# Patient Record
(demographics unavailable — no encounter records)

---

## 2024-10-25 NOTE — HISTORY OF PRESENT ILLNESS
[Back Pain] : back pain [___ mths] : [unfilled] month(s) ago [Constant] : constant [7] : a current pain level of 7/10 [6] : an average pain level of 6/10 [1] : a minimum pain level of 1/10 [8] : a maximum pain level of 8/10 [Shooting] : shooting [Laying] : laying [Medications] : medications [FreeTextEntry1] : Interval Note:  sp RIGHT  L4-sacral ala medial branch (2 joints, 3 nerves on each side) radiofrequency ablation 8/11/24 with improvement in axial pain but does have occasional exacerbations in pain. improved with tylenol.   Denies any additional weakness, numbness, bowel/bladder dysfunction.    HPI     Mr. YOSEF LUIS is a 57 year M with pmhx of HTN, HLD, pre-DM. C/o worsening right lower back pain. Pain to left lower back at times. Notes pain began 1 1/2 years ago and over the past few months has become more constant. Pain worst in the morning. Prn NSAIDs are modestly effective. Pain is impacting his quality of life and ability to perform ADL's. Denies any additional weakness, numbness, bowel/bladder dysfunction, history of bleeding disorders.   Previous and current pain medications/doses/effects: Motrin     Previous Pain Treatments: exercises     Previous Pain Injections:  RIGHT  L4-sacral ala medial branch (2 joints, 3 nerves on each side) radiofrequency ablation 8/11/24 RIGHT L4-sacral ala diagnostic medial branch block (2 joints, 3 nerves on each side) 8/13/24     Previous Diagnostic Studies/Images:  7/4/2024 View Order (Report matches exam selected in Patient History pane)  Exam: MRI LUMBAR SPINE Order#: [id number]    CLINICAL INFORMATION: Lower back pain  ADDITIONAL CLINICAL INFORMATION: Not Applicable  TECHNIQUE: Multiplanar, multisequence MRI was performed of the lumbar spine.  PRIOR STUDIES: No priors available for comparison.  FINDINGS:  LOCALIZER: Fusion in the cervical spine. BONES: There is no fracture or bone marrow edema. ALIGNMENT: The alignment is normal. SACROILIAC JOINTS/SACRUM: There is no sacral fracture. The SI joints are partially visualized but are intact. CONUS AND CAUDA EQUINA: The distal cord and conus are normal in signal. Conus terminates at L1-L2.  VISUALIZED INTRAPELVIC/INTRA-ABDOMINAL SOFT TISSUES: Normal. PARASPINAL SOFT TISSUES: Normal.   INDIVIDUAL LEVELS:  LOWER THORACIC SPINE: No spinal canal or neuroforaminal stenosis.  L1-L2: No spinal canal or neuroforaminal stenosis. L2-L3: No spinal canal or neuroforaminal stenosis. L3-L4: Moderate left facet arthrosis. Mild right facet arthrosis. No central canal narrowing. No foraminal narrowing. L4-L5: Mild to moderate facet and mild to moderate ligamentous hypertrophy. Disc bulging into left foramen. Mild to moderate left foraminal narrowing. Mild to moderate bilateral lateral recess narrowing. No central canal or right foraminal narrowing. L5-S1: Moderate to severe right facet arthrosis with a small right facet effusion. Moderate left facet arthrosis. Mild anterolisthesis. No central canal narrowing. Mild bilateral lateral recess narrowing. Mild to moderate left and mild right foraminal narrowing.    IMPRESSION:  Mild spondylosis most prominent at L5-S1 as described above    [FreeTextEntry7] : Lower back right side  [de-identified] : pressure  [FreeTextEntry4] : motrin

## 2024-10-25 NOTE — ASSESSMENT
[FreeTextEntry1] : >> Imaging and Other Studies   I personally reviewed the relevant imaging.  Discussed and explained to patient the likely source of pathology and pain.  Questions answered. MRI   >> Therapy and Other Modalities   PT  >> Medications  acetaminophen 650mg q8h prn pain (caution <3g daily)  I advised YOSEF that the NSAID should be taken with food.  In addition while taking the prescribed NSAID, no over the counter or other NSAIDs should be used, such as ibuprofen (Motrin or Advil) or naproxen (Aleve) as this can cause stomach upset or other side effects.  If needed for fever or breakthrough pain Tylenol can be used.   >> Interventions   Significant component of axial back pain secondary to lumbar spondylosis and facet arthropathy demonstrated on MRI LS.  Pain refractory to conservative treatments.  sp RIGHT L4-sacral ala diagnostic medial branch block (2 joints, 3 nerves on each side) with 90% improvement in back pain and function for 8 hours x 2  Given significant relief from diagnostic lumbar medial branch block of >80%, and significant improvement in function (as measured by BASILIA) and continued lumbar facet arthropathy pain (demonstrated on imaging) and axial back pain, sp RIGHT  L4-sacral ala medial branch (2 joints, 3 nerves on each side) radiofrequency ablation with improvement   >> Consults   n/a  >> Discussion of Risks/Benefits/Alternatives    >Regarding any scheduled procedures:   In the event the patient is scheduled for a procedure,   I have discussed in detail with the patient that any interventional pain procedure is associated with potential risks.  The procedure may include an injection of steroids and potentially other medications (local anesthetic and normal saline) into the epidural space or surrounding tissue of the spine.  There are significant risks of this procedure which include and are not limited to infection, bleeding, worsening pain, dural puncture leading to postdural puncture headache, nerve damage, spinal cord injury, paralysis, stroke, and death.   There is a chance that the procedure does not improve their pain.   There are risks associated with the steroid being absorbed into the body systemically.  These include dysphoria, difficulty sleeping, mood swings and personality changes.  Premenopausal women may notice an irregularity in her menstrual cycle for 2-3 months following the injection.  Steroids can specifically affect patients with hypertension, diabetes, and peptic ulcers.  The procedure may cause a temporary increase in blood pressure and blood pressure, and may adversely affect a peptic ulcer.  Other, more rare complications, include avascular necrosis of joints, glaucoma and worsening of osteoporosis.   I have discussed the risks of the procedure at length with the patient, and the potential benefits of pain relief.  I have offered alternatives to the procedure.  All questions were answered.   The patient expressed understanding and wishes to proceed with the procedure.   > Longitudinal management of Complex Painful condition   The patient is being managed for a complex condition that requires ongoing management.  The nature of this condition demands nuanced approach to treatment.  The seriousness of the condition necessitates an in-depth and focused approach to management and coordination with other healthcare professionals.    This visit involves intricate evaluation and management of the patient's condition.  The complexity of the visit was due to the need for detailed assessment of the current state, consideration of potential complications and a careful balancing of treatment options to management the chronic condition effectively.   As detailed above, the patient has a chronic significant painful condition that requires regular and detailed management.  The condition's impact on the patient's quality of life and health is substantial and necessitates a comprehensive and tailored approach   >> Conclusion   There were no barriers to communication. Informed patient that I would be available for any additional questions. Patient was instructed to call with any worsening symptoms including severe pain, new numbness/weakness, or changes in the bowel/bladder function. Discussed role of nsaids in pain management and all relevant risks, if patient is continuing to require after 4 weeks the patient should f/u for alternative treatment. Instructed patient to maintain pain diary to monitor pain level, mobility, and function.

## 2024-11-26 NOTE — PHYSICAL EXAM
[Normal muscle bulk without asymmetry] : normal muscle bulk without asymmetry [Normal] : Gait: normal [SLR] : positive straight leg raise [Facet Tenderness] : no facet tenderness

## 2024-11-26 NOTE — ASSESSMENT
[FreeTextEntry1] : >> Imaging and Other Studies   I personally reviewed the relevant imaging.  Discussed and explained to patient the likely source of pathology and pain.  Questions answered. MRI   >> Therapy and Other Modalities   PT  >> Medications  acetaminophen 650mg q8h prn pain (caution <3g daily)  I advised YOSEF that the NSAID should be taken with food.  In addition while taking the prescribed NSAID, no over the counter or other NSAIDs should be used, such as ibuprofen (Motrin or Advil) or naproxen (Aleve) as this can cause stomach upset or other side effects.  If needed for fever or breakthrough pain Tylenol can be used.   >> Interventions   Significant component of axial back pain secondary to lumbar spondylosis and facet arthropathy demonstrated on MRI LS.  Pain refractory to conservative treatments.  sp RIGHT L4-sacral ala diagnostic medial branch block (2 joints, 3 nerves on each side) with 90% improvement in back pain and function for 8 hours x 2  Given significant relief from diagnostic lumbar medial branch block of >80%, and significant improvement in function (as measured by BASILIA) and continued lumbar facet arthropathy pain (demonstrated on imaging) and axial back pain, sp RIGHT  L4-sacral ala medial branch (2 joints, 3 nerves on each side) radiofrequency ablation with improvement  Significant component of back and leg pain likely secondary to lumbar spinal stenosis demonstrated on MRI LS.  Will schedule L5-S1 interlaminar epidural steroid injection r/b/a discussed    >> Consults   n/a  >> Discussion of Risks/Benefits/Alternatives    >Regarding any scheduled procedures:   In the event the patient is scheduled for a procedure,   I have discussed in detail with the patient that any interventional pain procedure is associated with potential risks.  The procedure may include an injection of steroids and potentially other medications (local anesthetic and normal saline) into the epidural space or surrounding tissue of the spine.  There are significant risks of this procedure which include and are not limited to infection, bleeding, worsening pain, dural puncture leading to postdural puncture headache, nerve damage, spinal cord injury, paralysis, stroke, and death.   There is a chance that the procedure does not improve their pain.   There are risks associated with the steroid being absorbed into the body systemically.  These include dysphoria, difficulty sleeping, mood swings and personality changes.  Premenopausal women may notice an irregularity in her menstrual cycle for 2-3 months following the injection.  Steroids can specifically affect patients with hypertension, diabetes, and peptic ulcers.  The procedure may cause a temporary increase in blood pressure and blood pressure, and may adversely affect a peptic ulcer.  Other, more rare complications, include avascular necrosis of joints, glaucoma and worsening of osteoporosis.   I have discussed the risks of the procedure at length with the patient, and the potential benefits of pain relief.  I have offered alternatives to the procedure.  All questions were answered.   The patient expressed understanding and wishes to proceed with the procedure.   > Longitudinal management of Complex Painful condition   The patient is being managed for a complex condition that requires ongoing management.  The nature of this condition demands nuanced approach to treatment.  The seriousness of the condition necessitates an in-depth and focused approach to management and coordination with other healthcare professionals.    This visit involves intricate evaluation and management of the patient's condition.  The complexity of the visit was due to the need for detailed assessment of the current state, consideration of potential complications and a careful balancing of treatment options to management the chronic condition effectively.   As detailed above, the patient has a chronic significant painful condition that requires regular and detailed management.  The condition's impact on the patient's quality of life and health is substantial and necessitates a comprehensive and tailored approach   >> Conclusion   There were no barriers to communication. Informed patient that I would be available for any additional questions. Patient was instructed to call with any worsening symptoms including severe pain, new numbness/weakness, or changes in the bowel/bladder function. Discussed role of nsaids in pain management and all relevant risks, if patient is continuing to require after 4 weeks the patient should f/u for alternative treatment. Instructed patient to maintain pain diary to monitor pain level, mobility, and function.

## 2024-11-26 NOTE — HISTORY OF PRESENT ILLNESS
[Back Pain] : back pain [___ mths] : [unfilled] month(s) ago [Constant] : constant [7] : a current pain level of 7/10 [6] : an average pain level of 6/10 [1] : a minimum pain level of 1/10 [8] : a maximum pain level of 8/10 [Shooting] : shooting [Laying] : laying [Medications] : medications [FreeTextEntry1] : Interval Note:  sp RIGHT  L4-sacral ala medial branch (2 joints, 3 nerves on each side) radiofrequency ablation 8/11/24 with improvement in axial pain but does have occasional exacerbations in pain. improved with tylenol.   Reports that he continues to have significant right lower back pain with radiation to buttock.  Pain is so bad that patient finds it difficult to perform adls and ambulate. He is very frustrated with the pain    Denies any additional weakness, numbness, bowel/bladder dysfunction.    HPI     Mr. YOSEF LUIS is a 58 year M with pmhx of HTN, HLD, pre-DM. C/o worsening right lower back pain. Pain to left lower back at times. Notes pain began 1 1/2 years ago and over the past few months has become more constant. Pain worst in the morning. Prn NSAIDs are modestly effective. Pain is impacting his quality of life and ability to perform ADL's. Denies any additional weakness, numbness, bowel/bladder dysfunction, history of bleeding disorders.   Previous and current pain medications/doses/effects: Motrin     Previous Pain Treatments: exercises     Previous Pain Injections:  RIGHT  L4-sacral ala medial branch (2 joints, 3 nerves on each side) radiofrequency ablation 8/11/24 RIGHT L4-sacral ala diagnostic medial branch block (2 joints, 3 nerves on each side) 8/13/24     Previous Diagnostic Studies/Images:  7/4/2024 View Order (Report matches exam selected in Patient History pane)  Exam: MRI LUMBAR SPINE Order#: [id number]    CLINICAL INFORMATION: Lower back pain  ADDITIONAL CLINICAL INFORMATION: Not Applicable  TECHNIQUE: Multiplanar, multisequence MRI was performed of the lumbar spine.  PRIOR STUDIES: No priors available for comparison.  FINDINGS:  LOCALIZER: Fusion in the cervical spine. BONES: There is no fracture or bone marrow edema. ALIGNMENT: The alignment is normal. SACROILIAC JOINTS/SACRUM: There is no sacral fracture. The SI joints are partially visualized but are intact. CONUS AND CAUDA EQUINA: The distal cord and conus are normal in signal. Conus terminates at L1-L2.  VISUALIZED INTRAPELVIC/INTRA-ABDOMINAL SOFT TISSUES: Normal. PARASPINAL SOFT TISSUES: Normal.   INDIVIDUAL LEVELS:  LOWER THORACIC SPINE: No spinal canal or neuroforaminal stenosis.  L1-L2: No spinal canal or neuroforaminal stenosis. L2-L3: No spinal canal or neuroforaminal stenosis. L3-L4: Moderate left facet arthrosis. Mild right facet arthrosis. No central canal narrowing. No foraminal narrowing. L4-L5: Mild to moderate facet and mild to moderate ligamentous hypertrophy. Disc bulging into left foramen. Mild to moderate left foraminal narrowing. Mild to moderate bilateral lateral recess narrowing. No central canal or right foraminal narrowing. L5-S1: Moderate to severe right facet arthrosis with a small right facet effusion. Moderate left facet arthrosis. Mild anterolisthesis. No central canal narrowing. Mild bilateral lateral recess narrowing. Mild to moderate left and mild right foraminal narrowing.    IMPRESSION:  Mild spondylosis most prominent at L5-S1 as described above    [FreeTextEntry7] : Lower back right side  [de-identified] : pressure  [FreeTextEntry4] : motrin

## 2025-01-06 NOTE — HISTORY OF PRESENT ILLNESS
[FreeTextEntry8] : 58 year old male here for acute concern regarding body aches.  History of hypertension, prediabetes, hyperlipidemia, class 1 obesity. States feeling chills, shaking, hand/body aches and cramping. Reports both hands get stuck in fist positions with difficulty opening fingers, muscle spasms in feet/legs/abdomen/neck.  Denies fevers, chest pain, palpitations, shortness of breath, confusion, vision changes, lightheadedness, dizziness.

## 2025-01-06 NOTE — ASSESSMENT
[FreeTextEntry1] : Possibly due to electrolyte abnormality, dehydration, duloxetine adverse effect.  Labs drawn to evaluate further.  If labs normal advised either every other day dosing or med change.  All questions answered, patient expressed understanding, and in agreement with plan.

## 2025-01-23 NOTE — HEALTH RISK ASSESSMENT
[0] : 2) Feeling down, depressed, or hopeless: Not at all (0) [PHQ-2 Negative - No further assessment needed] : PHQ-2 Negative - No further assessment needed [Never] : Never [[id number]] : 0

## 2025-01-23 NOTE — HISTORY OF PRESENT ILLNESS
[de-identified] : 58 year old male here for follow up chronic conditions.  History of hypertension, prediabetes, hyperlipidemia, class 1 obesity.  Presents with wife.  Recent A1c 6.5%, increase from 6.0% in 9/2024.  Recent lipid panel total cholesterol 242, triglycerides 196, HDL 64, .  States he is compliant with metformin 500mg daily and atorvastatin 40mg daily. Weight is unchanged but has increased carbs.

## 2025-01-23 NOTE — HEALTH RISK ASSESSMENT
[0] : 2) Feeling down, depressed, or hopeless: Not at all (0) [PHQ-2 Negative - No further assessment needed] : PHQ-2 Negative - No further assessment needed [Never] : Never [[id number]] : 0

## 2025-01-23 NOTE — ASSESSMENT
[FreeTextEntry1] : Discussed results regarding increase in A1c and lipid compared to 9/2024.  Reviewed dietary recommendations and given nutrition referral. Plan to increase metformin to 500mg BID Plan to increase atorvastatin to 80mg daily.  Follow up 3 months for repeat labs.

## 2025-01-23 NOTE — PHYSICAL EXAM
[No Respiratory Distress] : no respiratory distress  [No Accessory Muscle Use] : no accessory muscle use [Normal Rate] : normal rate  [Alert and Oriented x3] : oriented to person, place, and time [Normal] : affect was normal and insight and judgment were intact

## 2025-01-23 NOTE — HISTORY OF PRESENT ILLNESS
[de-identified] : 58 year old male here for follow up chronic conditions.  History of hypertension, prediabetes, hyperlipidemia, class 1 obesity.  Presents with wife.  Recent A1c 6.5%, increase from 6.0% in 9/2024.  Recent lipid panel total cholesterol 242, triglycerides 196, HDL 64, .  States he is compliant with metformin 500mg daily and atorvastatin 40mg daily. Weight is unchanged but has increased carbs.

## 2025-02-05 NOTE — ASSESSMENT
[FreeTextEntry1] : >> Imaging and Other Studies   I personally reviewed the relevant imaging.  Discussed and explained to patient the likely source of pathology and pain.  Questions answered. MRI   >> Therapy and Other Modalities   PT  >> Medications  cymbalta 30mg  cautioned change in mood.  Encouraged to call with any worsening mood or depression/suicidal ideations  tizanidine prn spasm/pain  acetaminophen 650mg q8h prn pain (caution <3g daily)  I advised YOSEF that the NSAID should be taken with food.  In addition while taking the prescribed NSAID, no over the counter or other NSAIDs should be used, such as ibuprofen (Motrin or Advil) or naproxen (Aleve) as this can cause stomach upset or other side effects.  If needed for fever or breakthrough pain Tylenol can be used.   >> Interventions   Significant component of axial back pain secondary to lumbar spondylosis and facet arthropathy demonstrated on MRI LS.  Pain refractory to conservative treatments.  sp RIGHT L4-sacral ala diagnostic medial branch block (2 joints, 3 nerves on each side) with 90% improvement in back pain and function for 8 hours x 2  Given significant relief from diagnostic lumbar medial branch block of >80%, and significant improvement in function (as measured by BASILIA) and continued lumbar facet arthropathy pain (demonstrated on imaging) and axial back pain, sp RIGHT  L4-sacral ala medial branch (2 joints, 3 nerves on each side) radiofrequency ablation with improvement  Significant component of back and leg pain likely secondary to lumbar spinal stenosis demonstrated on MRI LS.  sp L5-S1 interlaminar epidural steroid injection with 80% improvement for 3 months  given efficacy of previous intervention that is >80% improvement in pain and improved ability to perform adls, and return of pain despite conservative treatment, will schedule repeat L5-S1 interlaminar epidural steroid injection r/b/a discussed    >> Consults   n/a  >> Discussion of Risks/Benefits/Alternatives    >Regarding any scheduled procedures:   In the event the patient is scheduled for a procedure,   I have discussed in detail with the patient that any interventional pain procedure is associated with potential risks.  The procedure may include an injection of steroids and potentially other medications (local anesthetic and normal saline) into the epidural space or surrounding tissue of the spine.  There are significant risks of this procedure which include and are not limited to infection, bleeding, worsening pain, dural puncture leading to postdural puncture headache, nerve damage, spinal cord injury, paralysis, stroke, and death.   There is a chance that the procedure does not improve their pain.   There are risks associated with the steroid being absorbed into the body systemically.  These include dysphoria, difficulty sleeping, mood swings and personality changes.  Premenopausal women may notice an irregularity in her menstrual cycle for 2-3 months following the injection.  Steroids can specifically affect patients with hypertension, diabetes, and peptic ulcers.  The procedure may cause a temporary increase in blood pressure and blood pressure, and may adversely affect a peptic ulcer.  Other, more rare complications, include avascular necrosis of joints, glaucoma and worsening of osteoporosis.   I have discussed the risks of the procedure at length with the patient, and the potential benefits of pain relief.  I have offered alternatives to the procedure.  All questions were answered.   The patient expressed understanding and wishes to proceed with the procedure.   > Longitudinal management of Complex Painful condition   The patient is being managed for a complex condition that requires ongoing management.  The nature of this condition demands nuanced approach to treatment.  The seriousness of the condition necessitates an in-depth and focused approach to management and coordination with other healthcare professionals.    This visit involves intricate evaluation and management of the patient's condition.  The complexity of the visit was due to the need for detailed assessment of the current state, consideration of potential complications and a careful balancing of treatment options to management the chronic condition effectively.   As detailed above, the patient has a chronic significant painful condition that requires regular and detailed management.  The condition's impact on the patient's quality of life and health is substantial and necessitates a comprehensive and tailored approach   >> Conclusion   There were no barriers to communication. Informed patient that I would be available for any additional questions. Patient was instructed to call with any worsening symptoms including severe pain, new numbness/weakness, or changes in the bowel/bladder function. Discussed role of nsaids in pain management and all relevant risks, if patient is continuing to require after 4 weeks the patient should f/u for alternative treatment. Instructed patient to maintain pain diary to monitor pain level, mobility, and function.

## 2025-02-05 NOTE — HISTORY OF PRESENT ILLNESS
[Back Pain] : back pain [___ mths] : [unfilled] month(s) ago [Constant] : constant [7] : a current pain level of 7/10 [6] : an average pain level of 6/10 [1] : a minimum pain level of 1/10 [8] : a maximum pain level of 8/10 [Shooting] : shooting [Laying] : laying [Medications] : medications [FreeTextEntry1] : Interval Note:  sp  L5-S1 interlaminar epidural steroid injection with improvement in back pain. Reports that he started cymbalta which improved pain.   Reports that he continues to have significant right lower back pain with radiation to buttock.  Pain is so bad that patient finds it difficult to perform adls and ambulate. He is very frustrated with the pain    Denies any additional weakness, numbness, bowel/bladder dysfunction.    HPI     Mr. YOSEF LUIS is a 58 year M with pmhx of HTN, HLD, pre-DM. C/o worsening right lower back pain. Pain to left lower back at times. Notes pain began 1 1/2 years ago and over the past few months has become more constant. Pain worst in the morning. Prn NSAIDs are modestly effective. Pain is impacting his quality of life and ability to perform ADL's. Denies any additional weakness, numbness, bowel/bladder dysfunction, history of bleeding disorders.   Previous and current pain medications/doses/effects: Motrin     Previous Pain Treatments: exercises     Previous Pain Injections:    L5-S1 interlaminar epidural steroid injection 12/18/24 RIGHT  L4-sacral ala medial branch (2 joints, 3 nerves on each side) radiofrequency ablation 8/11/24 RIGHT L4-sacral ala diagnostic medial branch block (2 joints, 3 nerves on each side) 8/13/24     Previous Diagnostic Studies/Images:  7/4/2024 View Order (Report matches exam selected in Patient History pane)  Exam: MRI LUMBAR SPINE Order#: [id number]    CLINICAL INFORMATION: Lower back pain  ADDITIONAL CLINICAL INFORMATION: Not Applicable  TECHNIQUE: Multiplanar, multisequence MRI was performed of the lumbar spine.  PRIOR STUDIES: No priors available for comparison.  FINDINGS:  LOCALIZER: Fusion in the cervical spine. BONES: There is no fracture or bone marrow edema. ALIGNMENT: The alignment is normal. SACROILIAC JOINTS/SACRUM: There is no sacral fracture. The SI joints are partially visualized but are intact. CONUS AND CAUDA EQUINA: The distal cord and conus are normal in signal. Conus terminates at L1-L2.  VISUALIZED INTRAPELVIC/INTRA-ABDOMINAL SOFT TISSUES: Normal. PARASPINAL SOFT TISSUES: Normal.   INDIVIDUAL LEVELS:  LOWER THORACIC SPINE: No spinal canal or neuroforaminal stenosis.  L1-L2: No spinal canal or neuroforaminal stenosis. L2-L3: No spinal canal or neuroforaminal stenosis. L3-L4: Moderate left facet arthrosis. Mild right facet arthrosis. No central canal narrowing. No foraminal narrowing. L4-L5: Mild to moderate facet and mild to moderate ligamentous hypertrophy. Disc bulging into left foramen. Mild to moderate left foraminal narrowing. Mild to moderate bilateral lateral recess narrowing. No central canal or right foraminal narrowing. L5-S1: Moderate to severe right facet arthrosis with a small right facet effusion. Moderate left facet arthrosis. Mild anterolisthesis. No central canal narrowing. Mild bilateral lateral recess narrowing. Mild to moderate left and mild right foraminal narrowing.    IMPRESSION:  Mild spondylosis most prominent at L5-S1 as described above    [FreeTextEntry7] : Lower back right side  [de-identified] : pressure  [FreeTextEntry4] : motrin

## 2025-02-05 NOTE — PHYSICAL EXAM
[Normal muscle bulk without asymmetry] : normal muscle bulk without asymmetry [Facet Tenderness] : facet tenderness [Spine: Flexion to ___ degrees, without pain] : spine: flexion to [unfilled] degrees, without pain [Normal] : Gait: normal [SLR] : positive straight leg raise

## 2025-03-26 NOTE — PROCEDURE
[FreeTextEntry1] : INTERLAMINAR EPIDURAL STEROID INJECTION   The patient was placed in the prone position on the fluoroscopic table with a pillow under the abdomen.  Routine monitors were applied.  A surgical timeout was performed.  The back was prepped and draped in the usual sterile fashion and sterile technique was adhered to throughout the entire procedure.   The [L5-S1] was identified under fluroscopic guidance.  The vertebral body end plates were aligned and the spinous process was midline between the lateral processes.  The skin and subcutaneous tissues were infiltrated with [1% Lidocaine].  After adequate local anesthesia a ["20g Tuohy"] needle was inserted at [L5-S1]   and aimed towards the affected side.    Using a loss of resistance to air technique the epidural space was identified.  After negative aspiration for heme and CSF, 1cc Omnipaque N180 contrast dye was injected.  Epidural spread of contrast was confirmed in the AP and lateral views.  The patient was injected with a mixture of 80mg kenalog with 1cc lidocaine 1% and 2cc of PF normal saline in incremental amounts.   The patient tolerated the procedure well.  There was no neurological deficit post procedure.  The patient went to recovery room in stable condition.  Discharge instructions were given to the patient.  All unused medications were wasted

## 2025-04-09 NOTE — ASSESSMENT
[FreeTextEntry1] : >> Imaging and Other Studies   I personally reviewed the relevant imaging.  Discussed and explained to patient the likely source of pathology and pain.  Questions answered. MRI   >> Therapy and Other Modalities   PT  >> Medications  cymbalta 30mg  cautioned change in mood.  Encouraged to call with any worsening mood or depression/suicidal ideations  tizanidine prn spasm/pain  acetaminophen 650mg q8h prn pain (caution <3g daily)  I advised YOSEF that the NSAID should be taken with food.  In addition while taking the prescribed NSAID, no over the counter or other NSAIDs should be used, such as ibuprofen (Motrin or Advil) or naproxen (Aleve) as this can cause stomach upset or other side effects.  If needed for fever or breakthrough pain Tylenol can be used.   >> Interventions   Significant component of axial back pain secondary to lumbar spondylosis and facet arthropathy demonstrated on MRI LS.  Pain refractory to conservative treatments.  sp RIGHT L4-sacral ala diagnostic medial branch block (2 joints, 3 nerves on each side) with 90% improvement in back pain and function for 8 hours x 2  Given significant relief from diagnostic lumbar medial branch block of >80%, and significant improvement in function (as measured by BASILIA) and continued lumbar facet arthropathy pain (demonstrated on imaging) and axial back pain, sp RIGHT  L4-sacral ala medial branch (2 joints, 3 nerves on each side) radiofrequency ablation with improvement  Significant component of back and leg pain likely secondary to lumbar spinal stenosis demonstrated on MRI LS.  sp L5-S1 interlaminar epidural steroid injection with 80% improvement   >> Consults   n/a  >> Discussion of Risks/Benefits/Alternatives    >Regarding any scheduled procedures:   In the event the patient is scheduled for a procedure,   I have discussed in detail with the patient that any interventional pain procedure is associated with potential risks.  The procedure may include an injection of steroids and potentially other medications (local anesthetic and normal saline) into the epidural space or surrounding tissue of the spine.  There are significant risks of this procedure which include and are not limited to infection, bleeding, worsening pain, dural puncture leading to postdural puncture headache, nerve damage, spinal cord injury, paralysis, stroke, and death.   There is a chance that the procedure does not improve their pain.   There are risks associated with the steroid being absorbed into the body systemically.  These include dysphoria, difficulty sleeping, mood swings and personality changes.  Premenopausal women may notice an irregularity in her menstrual cycle for 2-3 months following the injection.  Steroids can specifically affect patients with hypertension, diabetes, and peptic ulcers.  The procedure may cause a temporary increase in blood pressure and blood pressure, and may adversely affect a peptic ulcer.  Other, more rare complications, include avascular necrosis of joints, glaucoma and worsening of osteoporosis.   I have discussed the risks of the procedure at length with the patient, and the potential benefits of pain relief.  I have offered alternatives to the procedure.  All questions were answered.   The patient expressed understanding and wishes to proceed with the procedure.   > Longitudinal management of Complex Painful condition   The patient is being managed for a complex condition that requires ongoing management.  The nature of this condition demands nuanced approach to treatment.  The seriousness of the condition necessitates an in-depth and focused approach to management and coordination with other healthcare professionals.    This visit involves intricate evaluation and management of the patient's condition.  The complexity of the visit was due to the need for detailed assessment of the current state, consideration of potential complications and a careful balancing of treatment options to management the chronic condition effectively.   As detailed above, the patient has a chronic significant painful condition that requires regular and detailed management.  The condition's impact on the patient's quality of life and health is substantial and necessitates a comprehensive and tailored approach   >> Conclusion   There were no barriers to communication. Informed patient that I would be available for any additional questions. Patient was instructed to call with any worsening symptoms including severe pain, new numbness/weakness, or changes in the bowel/bladder function. Discussed role of nsaids in pain management and all relevant risks, if patient is continuing to require after 4 weeks the patient should f/u for alternative treatment. Instructed patient to maintain pain diary to monitor pain level, mobility, and function.

## 2025-04-09 NOTE — HISTORY OF PRESENT ILLNESS
[Back Pain] : back pain [___ mths] : [unfilled] month(s) ago [Constant] : constant [7] : a current pain level of 7/10 [6] : an average pain level of 6/10 [1] : a minimum pain level of 1/10 [8] : a maximum pain level of 8/10 [Shooting] : shooting [Laying] : laying [Medications] : medications [FreeTextEntry1] : Interval Note:  sp  L5-S1 interlaminar epidural steroid injection with improvement in back pain. Reports that he started cymbalta which improved pain.   Reports he feels great and has minimal discomfort.  Able to perform adls.    Denies any additional weakness, numbness, bowel/bladder dysfunction.    HPI     Mr. YOSEF LUIS is a 58 year M with pmhx of HTN, HLD, pre-DM. C/o worsening right lower back pain. Pain to left lower back at times. Notes pain began 1 1/2 years ago and over the past few months has become more constant. Pain worst in the morning. Prn NSAIDs are modestly effective. Pain is impacting his quality of life and ability to perform ADL's. Denies any additional weakness, numbness, bowel/bladder dysfunction, history of bleeding disorders.   Previous and current pain medications/doses/effects: Motrin     Previous Pain Treatments: exercises     Previous Pain Injections:    L5-S1 interlaminar epidural steroid injection 12/18/24 RIGHT  L4-sacral ala medial branch (2 joints, 3 nerves on each side) radiofrequency ablation 8/11/24 RIGHT L4-sacral ala diagnostic medial branch block (2 joints, 3 nerves on each side) 8/13/24     Previous Diagnostic Studies/Images:  7/4/2024 View Order (Report matches exam selected in Patient History pane)  Exam: MRI LUMBAR SPINE Order#: MRI [id number]    CLINICAL INFORMATION: Lower back pain  ADDITIONAL CLINICAL INFORMATION: Not Applicable  TECHNIQUE: Multiplanar, multisequence MRI was performed of the lumbar spine.  PRIOR STUDIES: No priors available for comparison.  FINDINGS:  LOCALIZER: Fusion in the cervical spine. BONES: There is no fracture or bone marrow edema. ALIGNMENT: The alignment is normal. SACROILIAC JOINTS/SACRUM: There is no sacral fracture. The SI joints are partially visualized but are intact. CONUS AND CAUDA EQUINA: The distal cord and conus are normal in signal. Conus terminates at L1-L2.  VISUALIZED INTRAPELVIC/INTRA-ABDOMINAL SOFT TISSUES: Normal. PARASPINAL SOFT TISSUES: Normal.   INDIVIDUAL LEVELS:  LOWER THORACIC SPINE: No spinal canal or neuroforaminal stenosis.  L1-L2: No spinal canal or neuroforaminal stenosis. L2-L3: No spinal canal or neuroforaminal stenosis. L3-L4: Moderate left facet arthrosis. Mild right facet arthrosis. No central canal narrowing. No foraminal narrowing. L4-L5: Mild to moderate facet and mild to moderate ligamentous hypertrophy. Disc bulging into left foramen. Mild to moderate left foraminal narrowing. Mild to moderate bilateral lateral recess narrowing. No central canal or right foraminal narrowing. L5-S1: Moderate to severe right facet arthrosis with a small right facet effusion. Moderate left facet arthrosis. Mild anterolisthesis. No central canal narrowing. Mild bilateral lateral recess narrowing. Mild to moderate left and mild right foraminal narrowing.    IMPRESSION:  Mild spondylosis most prominent at L5-S1 as described above    [FreeTextEntry7] : Lower back right side  [de-identified] : pressure  [FreeTextEntry4] : motrin

## 2025-05-02 NOTE — HISTORY OF PRESENT ILLNESS
[de-identified] : 58-year-old male here for follow-up of diabetes and hyperlipidemia. Last A1c 6.5% in 1/2025. Lipid panel total cholesterol 242, triglyceride 196, HDL 64, .

## 2025-05-02 NOTE — ASSESSMENT
[FreeTextEntry1] : Last A1c 6.5% in 1/2025. Lipid panel total cholesterol 242, triglyceride 196, HDL 64, . Repeat labs in office today. Advise to continue current med regimen.  Patient has several risk factors including worsening prediabetes, hyperlipidemia, MILAN on CPAP, class I obesity with BMI 33. Discussed weight management options including dietary, physical activity, medication assistance. Due to patient's chronic spinal issues he is unable to be physically active on a routine basis.  Following with pain management for this. Advised weight management referral for nutrition and medication assistance with GLP-1, which will likely improve his several risk factors above. Patient is in agreement.

## 2025-05-02 NOTE — HEALTH RISK ASSESSMENT
[0] : 2) Feeling down, depressed, or hopeless: Not at all (0) [PHQ-2 Negative - No further assessment needed] : PHQ-2 Negative - No further assessment needed [Never] : Never [[id number]] : 0

## 2025-06-11 NOTE — HISTORY OF PRESENT ILLNESS
[Improved Health] : Improved health [Quality of Life] : Quality of life [FreeTextEntry1] : Medical Hx: T2D (~5yrs), ADD, IBS, MILAN on CPAP, HTN, HLD, spinal stenosis (Back injections Q3 months) Surgical Hx: Cervical surgery  A1C 6.9% (5/2025), 6.5% (1/2025).  Accompanied by Estelle, wife    Wt steady since 2019. Highest Adult Wt: 200s   Diabetes medications: Metformin 1000 mg BID  Does not test BG at home.  Dietary pattern: B-may skip OR egg sandwich on a roll L-Beef with rice OR sandwich  No fried foods Not much sweets  Tends to have larger portions Snacks at night when he can't sleep  Water Intake: Good  ETOH: 1-2 glasses on the weekend   Exercise Regimen: none due to back pain Sleep: Poor sleep; melatonin does not help  Stress Level: manageable  Social Hx:

## 2025-06-11 NOTE — ASSESSMENT
[FreeTextEntry1] :  Counseled re weight management, diabetes disease process and management. Counseled re diet using the plate method. Plans to start walking with his wife.  Inquire with pain management type of injection he has been getting Referred to sleep clinic E-prescribed glucometer and supplies Start Mounjaro 2.5 mg weekly; discussed action, S/E, contraindications and injection technique Continue with Metformin Schedule eye exam  RTO 4 wks

## 2025-06-24 NOTE — HISTORY OF PRESENT ILLNESS
[Back Pain] : back pain [___ mths] : [unfilled] month(s) ago [Constant] : constant [7] : a current pain level of 7/10 [6] : an average pain level of 6/10 [1] : a minimum pain level of 1/10 [8] : a maximum pain level of 8/10 [Shooting] : shooting [Laying] : laying [Medications] : medications [FreeTextEntry1] : Interval Note:  Reports that he started cymbalta which improved pain. Reports that he is having some difficulty performing adls.  Pain is so bad that patient finds it difficult to perform adls   Denies any additional weakness, numbness, bowel/bladder dysfunction.    HPI     Mr. YOSEF LUIS is a 58 year M with pmhx of HTN, HLD, pre-DM. C/o worsening right lower back pain. Pain to left lower back at times. Notes pain began 1 1/2 years ago and over the past few months has become more constant. Pain worst in the morning. Prn NSAIDs are modestly effective. Pain is impacting his quality of life and ability to perform ADL's. Denies any additional weakness, numbness, bowel/bladder dysfunction, history of bleeding disorders.   Previous and current pain medications/doses/effects: Motrin     Previous Pain Treatments: exercises     Previous Pain Injections:  L5-S1 interlaminar epidural steroid injection 3/26/25  L5-S1 interlaminar epidural steroid injection 12/18/24 RIGHT  L4-sacral ala medial branch (2 joints, 3 nerves on each side) radiofrequency ablation 8/11/24 RIGHT L4-sacral ala diagnostic medial branch block (2 joints, 3 nerves on each side) 8/13/24     Previous Diagnostic Studies/Images:  7/4/2024 View Order (Report matches exam selected in Patient History pane)  Exam: MRI LUMBAR SPINE Order#: [id number]    CLINICAL INFORMATION: Lower back pain  ADDITIONAL CLINICAL INFORMATION: Not Applicable  TECHNIQUE: Multiplanar, multisequence MRI was performed of the lumbar spine.  PRIOR STUDIES: No priors available for comparison.  FINDINGS:  LOCALIZER: Fusion in the cervical spine. BONES: There is no fracture or bone marrow edema. ALIGNMENT: The alignment is normal. SACROILIAC JOINTS/SACRUM: There is no sacral fracture. The SI joints are partially visualized but are intact. CONUS AND CAUDA EQUINA: The distal cord and conus are normal in signal. Conus terminates at L1-L2.  VISUALIZED INTRAPELVIC/INTRA-ABDOMINAL SOFT TISSUES: Normal. PARASPINAL SOFT TISSUES: Normal.   INDIVIDUAL LEVELS:  LOWER THORACIC SPINE: No spinal canal or neuroforaminal stenosis.  L1-L2: No spinal canal or neuroforaminal stenosis. L2-L3: No spinal canal or neuroforaminal stenosis. L3-L4: Moderate left facet arthrosis. Mild right facet arthrosis. No central canal narrowing. No foraminal narrowing. L4-L5: Mild to moderate facet and mild to moderate ligamentous hypertrophy. Disc bulging into left foramen. Mild to moderate left foraminal narrowing. Mild to moderate bilateral lateral recess narrowing. No central canal or right foraminal narrowing. L5-S1: Moderate to severe right facet arthrosis with a small right facet effusion. Moderate left facet arthrosis. Mild anterolisthesis. No central canal narrowing. Mild bilateral lateral recess narrowing. Mild to moderate left and mild right foraminal narrowing.    IMPRESSION:  Mild spondylosis most prominent at L5-S1 as described above    [FreeTextEntry7] : Lower back right side  [de-identified] : pressure  [FreeTextEntry4] : motrin

## 2025-06-24 NOTE — ASSESSMENT
[FreeTextEntry1] : >> Imaging and Other Studies   I personally reviewed the relevant imaging.  Discussed and explained to patient the likely source of pathology and pain.  Questions answered. MRI   >> Therapy and Other Modalities   PT  >> Medications  cymbalta 60mg  cautioned change in mood.  Encouraged to call with any worsening mood or depression/suicidal ideations  tizanidine prn spasm/pain  acetaminophen 650mg q8h prn pain (caution <3g daily)  I advised YOSEF that the NSAID should be taken with food.  In addition while taking the prescribed NSAID, no over the counter or other NSAIDs should be used, such as ibuprofen (Motrin or Advil) or naproxen (Aleve) as this can cause stomach upset or other side effects.  If needed for fever or breakthrough pain Tylenol can be used.   >> Interventions   Significant component of axial back pain secondary to lumbar spondylosis and facet arthropathy demonstrated on MRI LS.  Pain refractory to conservative treatments.  sp RIGHT L4-sacral ala diagnostic medial branch block (2 joints, 3 nerves on each side) with 90% improvement in back pain and function for 8 hours x 2  Given significant relief from diagnostic lumbar medial branch block of >80%, and significant improvement in function (as measured by BASILIA) and continued lumbar facet arthropathy pain (demonstrated on imaging) and axial back pain, sp RIGHT  L4-sacral ala medial branch (2 joints, 3 nerves on each side) radiofrequency ablation with improvement  Significant component of back and leg pain likely secondary to lumbar spinal stenosis demonstrated on MRI LS.  sp L5-S1 interlaminar epidural steroid injection with 80% improvement  given efficacy of previous intervention that is >80% improvement in pain and improved ability to perform adls, and return of pain despite conservative treatment, will schedule repeat L5-S1 interlaminar epidural steroid injection  >> Consults   n/a  >> Discussion of Risks/Benefits/Alternatives    >Regarding any scheduled procedures:   In the event the patient is scheduled for a procedure,   I have discussed in detail with the patient that any interventional pain procedure is associated with potential risks.  The procedure may include an injection of steroids and potentially other medications (local anesthetic and normal saline) into the epidural space or surrounding tissue of the spine.  There are significant risks of this procedure which include and are not limited to infection, bleeding, worsening pain, dural puncture leading to postdural puncture headache, nerve damage, spinal cord injury, paralysis, stroke, and death.   There is a chance that the procedure does not improve their pain.   There are risks associated with the steroid being absorbed into the body systemically.  These include dysphoria, difficulty sleeping, mood swings and personality changes.  Premenopausal women may notice an irregularity in her menstrual cycle for 2-3 months following the injection.  Steroids can specifically affect patients with hypertension, diabetes, and peptic ulcers.  The procedure may cause a temporary increase in blood pressure and blood pressure, and may adversely affect a peptic ulcer.  Other, more rare complications, include avascular necrosis of joints, glaucoma and worsening of osteoporosis.   I have discussed the risks of the procedure at length with the patient, and the potential benefits of pain relief.  I have offered alternatives to the procedure.  All questions were answered.   The patient expressed understanding and wishes to proceed with the procedure.   > Longitudinal management of Complex Painful condition   The patient is being managed for a complex condition that requires ongoing management.  The nature of this condition demands nuanced approach to treatment.  The seriousness of the condition necessitates an in-depth and focused approach to management and coordination with other healthcare professionals.    This visit involves intricate evaluation and management of the patient's condition.  The complexity of the visit was due to the need for detailed assessment of the current state, consideration of potential complications and a careful balancing of treatment options to management the chronic condition effectively.   As detailed above, the patient has a chronic significant painful condition that requires regular and detailed management.  The condition's impact on the patient's quality of life and health is substantial and necessitates a comprehensive and tailored approach   >> Conclusion   There were no barriers to communication. Informed patient that I would be available for any additional questions. Patient was instructed to call with any worsening symptoms including severe pain, new numbness/weakness, or changes in the bowel/bladder function. Discussed role of nsaids in pain management and all relevant risks, if patient is continuing to require after 4 weeks the patient should f/u for alternative treatment. Instructed patient to maintain pain diary to monitor pain level, mobility, and function.

## 2025-07-16 NOTE — PHYSICAL EXAM
[de-identified] : Exam healthy male slightly overweight no acute distress.  Rises without difficulty and ambulates with a nonantalgic gait.  Able to heel and toe rise without difficulty.  No palpable muscle spasms.  Straight leg raising is negative.  Motor strength is 5 out of 5.  Reflexes are within normal limits and symmetrical.  Hip range of motion is painless. [de-identified] : I independently reviewed an MRI scan lumbar spine from June 2024.  It demonstrates disc degeneration with central disc herniation and very mild bilateral recess stenosis at L4-5 and L5-S1.

## 2025-07-16 NOTE — HISTORY OF PRESENT ILLNESS
[de-identified] : 58-year-old male with a chief complaint of back pain.  She has had problems on and off now for many years.  Nontraumatic in nature.  It is back pain more on the right side than on the left side.  He has been seeing Dr. Locke from pain management who is been giving him injections.  Those actually have been helping him considerably.  He functions pretty normally for the most part when he does receive the injections.  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  He has no trouble walking distances.  There is no numbness tingling weakness.  Feels as though overall his quality of life is good.

## 2025-07-16 NOTE — ASSESSMENT
[FreeTextEntry1] : Mr. Clark has lumbar degenerative disc disease and radiculopathy.  We do lengthy talk today in the office.  Decision about surgery at this point his quality of life.  I personally would not recommend it right away because he would need to have a two-level lumbar fusion.  We talked about the consequences of that.  We talked about continuing to manage the pain utilizing the injections and the oral medications.  When he gets to a point that his quality of life is poor then I would suggest he consider surgery.  We talked about taking anti-inflammatory in a daily basis.  We talked about exercising.  We talked about weight loss.  Follow-up with me now in the future when needed.  All questions addressed.

## 2025-07-23 NOTE — ASSESSMENT
[FreeTextEntry1] :  Instructed on use of Freestyle lite meter  Reviewed BG targets and correlation with A1C Increase Ozempic to 0.5 mg weekly Have at least 2 meals per day Ensure adequate protein and fiber intake  RTO 6 weeks

## 2025-07-23 NOTE — HISTORY OF PRESENT ILLNESS
[FreeTextEntry1] : Medical Hx: T2D (~5yrs), ADD, IBS, MILAN on CPAP, HTN, HLD, spinal stenosis (Back injections Q3 months) Surgical Hx: Cervical surgery  A1C 6.9% (5/2025), 6.5% (1/2025).  Accompanied by Estelle, wife    Wt steady since 2019. Highest Adult Wt: 200s   Diabetes medications: Metformin 1000 mg BID  Does not test BG at home.  Dietary pattern: B-may skip OR egg sandwich on a roll L-Beef with rice OR sandwich  No fried foods Not much sweets  Tends to have larger portions Snacks at night when he can't sleep  Water Intake: Good  ETOH: 1-2 glasses on the weekend   Exercise Regimen: none due to back pain Sleep: Poor sleep; melatonin does not help  Stress Level: manageable   7/23/25: Ozempic 0.25 mg weekly, Metformin 1000 mg once daily; +fatigue, trouble sleeping. Less appetite. +cravings. B-may skip, D-salad/chicken. Snacking after dinner. Brought in BG meter to review technique.